# Patient Record
Sex: MALE | Race: BLACK OR AFRICAN AMERICAN | NOT HISPANIC OR LATINO | Employment: FULL TIME | URBAN - METROPOLITAN AREA
[De-identification: names, ages, dates, MRNs, and addresses within clinical notes are randomized per-mention and may not be internally consistent; named-entity substitution may affect disease eponyms.]

---

## 2022-08-13 ENCOUNTER — APPOINTMENT (OUTPATIENT)
Dept: RADIOLOGY | Facility: MEDICAL CENTER | Age: 52
End: 2022-08-13
Attending: EMERGENCY MEDICINE

## 2022-08-13 ENCOUNTER — HOSPITAL ENCOUNTER (EMERGENCY)
Facility: MEDICAL CENTER | Age: 52
End: 2022-08-13
Attending: EMERGENCY MEDICINE

## 2022-08-13 VITALS
HEIGHT: 71 IN | DIASTOLIC BLOOD PRESSURE: 83 MMHG | BODY MASS INDEX: 23.64 KG/M2 | OXYGEN SATURATION: 95 % | HEART RATE: 70 BPM | TEMPERATURE: 97.1 F | WEIGHT: 168.87 LBS | RESPIRATION RATE: 18 BRPM | SYSTOLIC BLOOD PRESSURE: 126 MMHG

## 2022-08-13 DIAGNOSIS — M25.511 ACUTE PAIN OF RIGHT SHOULDER: ICD-10-CM
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
## 2022-08-13 DIAGNOSIS — M79.672 LEFT FOOT PAIN: ICD-10-CM

## 2022-08-13 PROCEDURE — 700102 HCHG RX REV CODE 250 W/ 637 OVERRIDE(OP): Performed by: EMERGENCY MEDICINE

## 2022-08-13 PROCEDURE — 99284 EMERGENCY DEPT VISIT MOD MDM: CPT

## 2022-08-13 PROCEDURE — 73030 X-RAY EXAM OF SHOULDER: CPT | Mod: RT

## 2022-08-13 PROCEDURE — 73630 X-RAY EXAM OF FOOT: CPT | Mod: LT

## 2022-08-13 PROCEDURE — A9270 NON-COVERED ITEM OR SERVICE: HCPCS | Performed by: EMERGENCY MEDICINE

## 2022-08-13 RX ADMIN — IBUPROFEN 800 MG: 600 TABLET ORAL at 19:04

## 2022-08-13 ASSESSMENT — LIFESTYLE VARIABLES
EVER FELT BAD OR GUILTY ABOUT YOUR DRINKING: NO
TOTAL SCORE: 0
DOES PATIENT WANT TO STOP DRINKING: NO
HAVE PEOPLE ANNOYED YOU BY CRITICIZING YOUR DRINKING: NO
HAVE YOU EVER FELT YOU SHOULD CUT DOWN ON YOUR DRINKING: NO
TOTAL SCORE: 0
EVER HAD A DRINK FIRST THING IN THE MORNING TO STEADY YOUR NERVES TO GET RID OF A HANGOVER: NO
CONSUMPTION TOTAL: INCOMPLETE
TOTAL SCORE: 0
DO YOU DRINK ALCOHOL: YES

## 2022-08-14 NOTE — DISCHARGE INSTRUCTIONS
You probably have contusions which are bruises or strains of the shoulder and foot.  Take ibuprofen 800 mg 3-4 times a day unless it upsets the stomach.  Add Tylenol for persistent pain.  See a doctor if not improving in 1 to 2 weeks.  Ice may help your foot and shoulder.  Return to the ER for shortness of breath, dizziness, abdominal pain or fever.  This is not expected.

## 2022-08-14 NOTE — ED NOTES
"Pt discharged home. Pt left prior to receiving discharge paperwork. /83   Pulse 70   Temp 36.2 °C (97.1 °F) (Temporal)   Resp 18   Ht 1.803 m (5' 11\")   Wt 76.6 kg (168 lb 14 oz)   SpO2 95%   BMI 23.55 kg/m²       "
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
